# Patient Record
Sex: MALE | Race: WHITE
[De-identification: names, ages, dates, MRNs, and addresses within clinical notes are randomized per-mention and may not be internally consistent; named-entity substitution may affect disease eponyms.]

---

## 2018-05-15 ENCOUNTER — HOSPITAL ENCOUNTER (OUTPATIENT)
Dept: HOSPITAL 95 - LAB | Age: 53
End: 2018-05-15
Attending: NURSE PRACTITIONER
Payer: COMMERCIAL

## 2018-05-15 DIAGNOSIS — E11.9: Primary | ICD-10-CM

## 2018-05-15 LAB
CREAT UR-MCNC: 58.8 MG/DL (ref 27–270)
MICROALBUMIN UR-MCNC: 5.15 MG/L (ref 0–20)
MICROALBUMIN/CREAT UR: 8.76 MG/G (ref 0–30)

## 2018-09-19 ENCOUNTER — HOSPITAL ENCOUNTER (EMERGENCY)
Dept: HOSPITAL 95 - ER | Age: 53
Discharge: HOME | End: 2018-09-19
Payer: COMMERCIAL

## 2018-09-19 VITALS — HEIGHT: 73 IN | WEIGHT: 270 LBS | BODY MASS INDEX: 35.78 KG/M2

## 2018-09-19 DIAGNOSIS — B86: Primary | ICD-10-CM

## 2018-09-19 DIAGNOSIS — F17.200: ICD-10-CM

## 2019-11-24 ENCOUNTER — HOSPITAL ENCOUNTER (EMERGENCY)
Dept: HOSPITAL 95 - ER | Age: 54
Discharge: HOME | End: 2019-11-24
Payer: COMMERCIAL

## 2019-11-24 VITALS — WEIGHT: 270 LBS | HEIGHT: 73 IN | BODY MASS INDEX: 35.78 KG/M2

## 2019-11-24 DIAGNOSIS — F17.200: ICD-10-CM

## 2019-11-24 DIAGNOSIS — M26.603: Primary | ICD-10-CM

## 2019-11-24 DIAGNOSIS — E11.9: ICD-10-CM

## 2019-11-24 DIAGNOSIS — Z86.73: ICD-10-CM

## 2021-02-14 ENCOUNTER — HOSPITAL ENCOUNTER (EMERGENCY)
Dept: HOSPITAL 95 - ER | Age: 56
LOS: 1 days | Discharge: HOME | End: 2021-02-15
Payer: COMMERCIAL

## 2021-02-14 VITALS — BODY MASS INDEX: 29.82 KG/M2 | HEIGHT: 73 IN | WEIGHT: 225 LBS
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
## 2021-02-14 DIAGNOSIS — J44.9: ICD-10-CM

## 2021-02-14 DIAGNOSIS — S62.636B: Primary | ICD-10-CM

## 2021-02-14 DIAGNOSIS — K21.9: ICD-10-CM

## 2021-02-14 DIAGNOSIS — E11.9: ICD-10-CM

## 2021-02-14 DIAGNOSIS — Z86.73: ICD-10-CM

## 2021-02-14 DIAGNOSIS — F17.200: ICD-10-CM

## 2021-02-14 DIAGNOSIS — Z23: ICD-10-CM

## 2021-02-14 PROCEDURE — A9270 NON-COVERED ITEM OR SERVICE: HCPCS

## 2021-10-01 ENCOUNTER — HOSPITAL ENCOUNTER (INPATIENT)
Dept: HOSPITAL 95 - ER | Age: 56
LOS: 6 days | Discharge: HOME | DRG: 871 | End: 2021-10-07
Attending: HOSPITALIST | Admitting: HOSPITALIST
Payer: COMMERCIAL

## 2021-10-01 VITALS — BODY MASS INDEX: 32.43 KG/M2 | HEIGHT: 73 IN | WEIGHT: 244.71 LBS

## 2021-10-01 DIAGNOSIS — I24.8: ICD-10-CM

## 2021-10-01 DIAGNOSIS — E11.9: ICD-10-CM

## 2021-10-01 DIAGNOSIS — E87.6: ICD-10-CM

## 2021-10-01 DIAGNOSIS — K21.9: ICD-10-CM

## 2021-10-01 DIAGNOSIS — A40.3: Primary | ICD-10-CM

## 2021-10-01 DIAGNOSIS — I48.0: ICD-10-CM

## 2021-10-01 DIAGNOSIS — R65.21: ICD-10-CM

## 2021-10-01 DIAGNOSIS — G40.909: ICD-10-CM

## 2021-10-01 DIAGNOSIS — F32.9: ICD-10-CM

## 2021-10-01 DIAGNOSIS — F17.210: ICD-10-CM

## 2021-10-01 DIAGNOSIS — Z86.73: ICD-10-CM

## 2021-10-01 DIAGNOSIS — Z20.822: ICD-10-CM

## 2021-10-01 DIAGNOSIS — J44.0: ICD-10-CM

## 2021-10-01 DIAGNOSIS — J13: ICD-10-CM

## 2021-10-01 DIAGNOSIS — Z86.14: ICD-10-CM

## 2021-10-01 DIAGNOSIS — Z79.899: ICD-10-CM

## 2021-10-01 DIAGNOSIS — J96.01: ICD-10-CM

## 2021-10-01 LAB
ALBUMIN SERPL BCP-MCNC: 2.6 G/DL (ref 3.4–5)
ALBUMIN/GLOB SERPL: 0.6 {RATIO} (ref 0.8–1.8)
ALT SERPL W P-5'-P-CCNC: 54 U/L (ref 12–78)
AMPHETAMINES UR SCN-MCNC: DETECTED NG/ML
AMPHETAMINES UR-MCNC: DETECTED UG/L
ANION GAP SERPL CALCULATED.4IONS-SCNC: 11 MMOL/L (ref 6–16)
AST SERPL W P-5'-P-CCNC: 76 U/L (ref 12–37)
BASOPHILS # BLD: 0 K/MM3 (ref 0–0.23)
BASOPHILS NFR BLD: 0 % (ref 0–2)
BILIRUB SERPL-MCNC: 0.9 MG/DL (ref 0.1–1)
BUN SERPL-MCNC: 33 MG/DL (ref 8–24)
CALCIUM SERPL-MCNC: 9 MG/DL (ref 8.5–10.1)
CHLORIDE SERPL-SCNC: 102 MMOL/L (ref 98–108)
CO2 SERPL-SCNC: 23 MMOL/L (ref 21–32)
CREAT SERPL-MCNC: 1.2 MG/DL (ref 0.6–1.2)
DEPRECATED RDW RBC AUTO: 39.7 FL (ref 35.1–46.3)
EOSINOPHIL # BLD: 0 K/MM3 (ref 0–0.68)
EOSINOPHIL NFR BLD: 0 % (ref 0–6)
ERYTHROCYTE [DISTWIDTH] IN BLOOD BY AUTOMATED COUNT: 12.3 % (ref 11.7–14.2)
GLOBULIN SER CALC-MCNC: 4.4 G/DL (ref 2.2–4)
GLUCOSE SERPL-MCNC: 129 MG/DL (ref 70–99)
HCT VFR BLD AUTO: 42.5 % (ref 37–53)
HGB BLD-MCNC: 14.7 G/DL (ref 13.5–17.5)
LYMPHOCYTES # BLD: 1.64 K/MM3 (ref 0.84–5.2)
LYMPHOCYTES NFR BLD: 17 % (ref 21–46)
MCHC RBC AUTO-ENTMCNC: 34.6 G/DL (ref 31.5–36.5)
MCV RBC AUTO: 87 FL (ref 80–100)
METAMYELOCYTES # BLD MANUAL: 0.95 K/MM3 (ref 0–0)
METAMYELOCYTES NFR BLD MANUAL: 11 % (ref 0–0)
MONOCYTES # BLD: 0.6 K/MM3 (ref 0.16–1.47)
MONOCYTES NFR BLD: 7 % (ref 4–13)
NEUTS BAND NFR BLD MANUAL: 37 % (ref 0–8)
NEUTS SEG # BLD MANUAL: 5.45 K/MM3 (ref 1.96–9.15)
NEUTS SEG NFR BLD MANUAL: 26 % (ref 41–73)
NRBC # BLD AUTO: 0 K/MM3 (ref 0–0.02)
NRBC BLD AUTO-RTO: 0 /100 WBC (ref 0–0.2)
PLATELET # BLD AUTO: 147 K/MM3 (ref 150–400)
POTASSIUM SERPL-SCNC: 4 MMOL/L (ref 3.5–5.5)
PROT SERPL-MCNC: 7 G/DL (ref 6.4–8.2)
PROT UR STRIP-MCNC: (no result) MG/DL
RBC #/AREA URNS HPF: (no result) /HPF (ref 0–2)
SARS-COV-2 RNA RESP QL NAA+PROBE: NEGATIVE
SODIUM SERPL-SCNC: 136 MMOL/L (ref 136–145)
SP GR SPEC: 1.01 (ref 1–1.02)
TOTAL CELLS COUNTED BLD: 100
TROPONIN I SERPL-MCNC: 0.07 NG/ML (ref 0–0.04)
UROBILINOGEN UR STRIP-MCNC: (no result) MG/DL
VARIANT LYMPHS NFR BLD MANUAL: 2 % (ref 0–0)
WBC #/AREA URNS HPF: (no result) /HPF (ref 0–5)

## 2021-10-01 PROCEDURE — A9270 NON-COVERED ITEM OR SERVICE: HCPCS

## 2021-10-01 PROCEDURE — C9113 INJ PANTOPRAZOLE SODIUM, VIA: HCPCS

## 2021-10-01 PROCEDURE — U0004 COV-19 TEST NON-CDC HGH THRU: HCPCS

## 2021-10-01 NOTE — NUR
Care Assumed 1205- New admit from ED
 
Pt arrived via bed, able to stand and transfer to ICU bed. A/O to location,
event, and year. Following directions, slow to respond. LS clear/dim, on 5 L
via NC, SPO2 89-92%. States having CP 4/10, on/off x 3 days, states he feels
pressure during deep inspiration. States having nausea but no vomiting thus
far. Has productive cough but no sputum yet, will send sample. Quickly falls
asleep when left alone, snoring, denies use of CPAP at home or oxygen. SIT,
MAP > 65.

## 2021-10-01 NOTE — NUR
Shift summary
 
Pt remains A/O X4, location, event, year, and following commands. Able to
ambulate to bedside commode. LR @ 75 ml/hr, MAP > 65. SIT. Remains on 5 L NC,
SPO2 90'S. Snoring while sleeping. Spoke to patients girlfriend and updated on
current care being provided. Watching TV currently. Will report to oncoming
shift.

## 2021-10-01 NOTE — NUR
REPORT RECEIVED-CARE ASSUMED-LR INFUSING @ 75 ML/HR. PT AWAKE-WATCHING TV.
CONTINUE ASSESSMENTS AND CARE.

## 2021-10-01 NOTE — NUR
Provider call
 
Spoke to MELANIA Long in regards to patient SBP of 140's. New orders recieved to
decrease LR to 75 ml/hr and DC Vanco. Updated on pt also having loose BM.
Started PT on VSL tabs. Pt able to ambulate to bedside commode, tolerated
well.

## 2021-10-02 LAB
ALBUMIN SERPL BCP-MCNC: 2 G/DL (ref 3.4–5)
ALBUMIN/GLOB SERPL: 0.5 {RATIO} (ref 0.8–1.8)
ALT SERPL W P-5'-P-CCNC: 38 U/L (ref 12–78)
ANION GAP SERPL CALCULATED.4IONS-SCNC: 7 MMOL/L (ref 6–16)
AST SERPL W P-5'-P-CCNC: 47 U/L (ref 12–37)
BASOPHILS # BLD: 0 K/MM3 (ref 0–0.23)
BASOPHILS NFR BLD: 0 % (ref 0–2)
BILIRUB SERPL-MCNC: 1 MG/DL (ref 0.1–1)
BUN SERPL-MCNC: 19 MG/DL (ref 8–24)
CALCIUM SERPL-MCNC: 8.6 MG/DL (ref 8.5–10.1)
CHLORIDE SERPL-SCNC: 107 MMOL/L (ref 98–108)
CO2 SERPL-SCNC: 25 MMOL/L (ref 21–32)
CREAT SERPL-MCNC: 0.76 MG/DL (ref 0.6–1.2)
DEPRECATED RDW RBC AUTO: 40 FL (ref 35.1–46.3)
EOSINOPHIL # BLD: 0.11 K/MM3 (ref 0–0.68)
EOSINOPHIL NFR BLD: 1 % (ref 0–6)
ERYTHROCYTE [DISTWIDTH] IN BLOOD BY AUTOMATED COUNT: 12.5 % (ref 11.7–14.2)
GLOBULIN SER CALC-MCNC: 4 G/DL (ref 2.2–4)
GLUCOSE SERPL-MCNC: 88 MG/DL (ref 70–99)
HCT VFR BLD AUTO: 36.1 % (ref 37–53)
HGB BLD-MCNC: 12.3 G/DL (ref 13.5–17.5)
LYMPHOCYTES # BLD: 1.47 K/MM3 (ref 0.84–5.2)
LYMPHOCYTES NFR BLD: 13 % (ref 21–46)
MAGNESIUM SERPL-MCNC: 1.8 MG/DL (ref 1.6–2.4)
MCHC RBC AUTO-ENTMCNC: 34.1 G/DL (ref 31.5–36.5)
MCV RBC AUTO: 87 FL (ref 80–100)
METAMYELOCYTES # BLD MANUAL: 0.11 K/MM3 (ref 0–0)
METAMYELOCYTES NFR BLD MANUAL: 1 % (ref 0–0)
MONOCYTES # BLD: 0.33 K/MM3 (ref 0.16–1.47)
MONOCYTES NFR BLD: 3 % (ref 4–13)
NEUTS BAND NFR BLD MANUAL: 30 % (ref 0–8)
NEUTS SEG # BLD MANUAL: 9.29 K/MM3 (ref 1.96–9.15)
NEUTS SEG NFR BLD MANUAL: 52 % (ref 41–73)
NRBC # BLD AUTO: 0 K/MM3 (ref 0–0.02)
NRBC BLD AUTO-RTO: 0 /100 WBC (ref 0–0.2)
PLATELET # BLD AUTO: 138 K/MM3 (ref 150–400)
POTASSIUM SERPL-SCNC: 3.6 MMOL/L (ref 3.5–5.5)
PROT SERPL-MCNC: 6 G/DL (ref 6.4–8.2)
SODIUM SERPL-SCNC: 139 MMOL/L (ref 136–145)
TOTAL CELLS COUNTED BLD: 100

## 2021-10-02 NOTE — NUR
PT'S HEART RATE MAINTAINED 'S FOR GREATER THAN 15 MIN; DR. FOUNTAIN
WAS CALLED AT 0828; ORDERS PROVIDED FOR RX FOR TACHYCARDIA AND
ADMINISTERED PER MAR; PROVIDER AT BEDSIDE TO ASSESS; HEART RATE
MAINTAINED IN 80'S-90'S AFTER METOPROLOL/LABETALOL ADMINISTRATION; STATUS
CHANGE ORDER PLACED AT 0856 FOR PCU; PT VOIDED 3X IN TOILET
AND REFUSED URINAL; RX GIVEN PER MAR; PT REPORTED FRUSTRATION WITH ICU
MONITORING; PT'S DOC WOOD CALLED FOR UPDATES 2X AND WAS CALLED 1X TO
NOTIFY OF PT'S ROOM ASSIGNMENT IN PCU; AT 1709 TALON, RN WAS CALLED AND REPORT
GIVEN OVER THE PHONE; PT LEFT UNIT AT 1725 VIA WHEELCHAIR WITH 5LNC, NO
MONITORING, WITH PERSONAL PHONE AND CLOTHING, ACCOMPANIED BY [id number]; PT REPORTED
NO ADDITIONAL CONCERNS AT [facility]

## 2021-10-02 NOTE — NUR
Assumed care of pt at 1900. Patient sitting in bed, sleeping. 5L NC and
satting above 90%. Afib avg 95, but has jumped numerous times into 170's.
Patient is very lethargic. Awake to answer questions appropriately, but
quickly falls back asleep. Dim t/o with a strong non-productive cough. Redness
noted at the back of the mouth/throat.

## 2021-10-02 NOTE — NUR
END OF SHIFT NOTE
PT ON 5 L N/C OVERNIGHT-DESATS WITH ACTIVITY-PT UP TO BSC TO VOID-ONGOING
HACKING COUGH. ST ON THE MONITOR 100-118. VITALS-SURGE SHIFT ASSESSMENTS X
3-AND GTT RATES NOTED IN FLOWSHEET.
CONINUTE ASSESSMENTS AND CARE TILL
REPORT OFF TO ONCOMING SHIFT RN.

## 2021-10-02 NOTE — NUR
Telephone report received from Sara GUTIÉRREZ RN at this time. Anticipate arrival
of pt to PCU 3 shortly.

## 2021-10-02 NOTE — NUR
Received pt from ICU in wheelchair, wearing oxygen at 5 l/min.  Frequent
produtive cough.  Scattered crackles auscultated throughout, no wheezing.
Mild dyspnea and tachypnea noted, but pt states that he is so much better than
yesterday. States it's like "night and day".  Lactated ringer's infusing via
IV right antecubital space.  States good appetite, asking also for ice water.
He called his fiance Maria T on the phone and told her that he had been moved to
PCU.  Sitting up in bed, eating dinner at this time, watching TV.
Sinus tachycardia noted on telemetry monitor, rate 104-109 bpm.

## 2021-10-03 LAB
BASOPHILS # BLD AUTO: 0.05 K/MM3 (ref 0–0.23)
BASOPHILS NFR BLD AUTO: 1 % (ref 0–2)
DEPRECATED RDW RBC AUTO: 39.9 FL (ref 35.1–46.3)
EOSINOPHIL # BLD AUTO: 0.27 K/MM3 (ref 0–0.68)
EOSINOPHIL NFR BLD AUTO: 3 % (ref 0–6)
ERYTHROCYTE [DISTWIDTH] IN BLOOD BY AUTOMATED COUNT: 12.5 % (ref 11.7–14.2)
HCT VFR BLD AUTO: 33.7 % (ref 37–53)
HGB BLD-MCNC: 11.6 G/DL (ref 13.5–17.5)
IMM GRANULOCYTES # BLD AUTO: 0.2 K/MM3 (ref 0–0.1)
IMM GRANULOCYTES NFR BLD AUTO: 2 % (ref 0–1)
LYMPHOCYTES # BLD AUTO: 1 K/MM3 (ref 0.84–5.2)
LYMPHOCYTES NFR BLD AUTO: 10 % (ref 21–46)
MCHC RBC AUTO-ENTMCNC: 34.4 G/DL (ref 31.5–36.5)
MCV RBC AUTO: 88 FL (ref 80–100)
MONOCYTES # BLD AUTO: 1.33 K/MM3 (ref 0.16–1.47)
MONOCYTES NFR BLD AUTO: 13 % (ref 4–13)
NEUTROPHILS # BLD AUTO: 7.26 K/MM3 (ref 1.96–9.15)
NEUTROPHILS NFR BLD AUTO: 72 % (ref 41–73)
NRBC # BLD AUTO: 0 K/MM3 (ref 0–0.02)
NRBC BLD AUTO-RTO: 0 /100 WBC (ref 0–0.2)
PLATELET # BLD AUTO: 160 K/MM3 (ref 150–400)

## 2021-10-03 NOTE — NUR
Patient appears very drowsy and exhausted. Afib in the 170's at the beginning
of the shift, cardizem was ordered but after the PO metoprolol the HR slowed
to 80-90's still in Afib. Diminished t/o with strong non-productive cough.
Satting high 90's on 6L HF NC. Patient had a hard time getting quality sleep
d/t waking up and coughing or nausea. PRN Zofran and Ambien given and patient
resting comfortably. Will report to dayshift RN.

## 2021-10-03 NOTE — NUR
SHIFT SUMMARY
ASSUMED CARE OF PATIENT AT APPROX 0700. PT APPEARS TO BE SLEEPING,
RESPONDING TO VERBAL STIMULI, BUT QUICKLY FALLING BACK TO SLEEP. UNABLE TO
HOLD HEAD UP FOR LONG. ANSWERING QUESTIONS APPROPRAITELY. PT DENIES PAIN,
CHEST PAIN, NAUSEA, DIZZINESS AND NUMB/TINGLING. PT REPORTS DRY NONPRODUCTIVE
COUGHT. PT ON 5-6L O2 VIA NC, THIS AM, TITRATED TO 4L O2 VIA NC THIS AM. RESP
RATE 20-38 BREATHES. PT RECIEVING IV ANTIVBIOTICS AND IV FLUIDS. OTHER VSS. NO
OTHER ACUTE CHANGES NOTED. WILL CONTINUE TO MONITOR UNITL REPORT GIVEN TO
ONCOMING RN.

## 2021-10-04 LAB
ALBUMIN SERPL BCP-MCNC: 1.7 G/DL (ref 3.4–5)
ANION GAP SERPL CALCULATED.4IONS-SCNC: 9 MMOL/L (ref 6–16)
BUN SERPL-MCNC: 14 MG/DL (ref 8–24)
CALCIUM SERPL-MCNC: 8.4 MG/DL (ref 8.5–10.1)
CHLORIDE SERPL-SCNC: 104 MMOL/L (ref 98–108)
CO2 SERPL-SCNC: 26 MMOL/L (ref 21–32)
CREAT SERPL-MCNC: 0.65 MG/DL (ref 0.6–1.2)
DEPRECATED RDW RBC AUTO: 40 FL (ref 35.1–46.3)
ERYTHROCYTE [DISTWIDTH] IN BLOOD BY AUTOMATED COUNT: 12.5 % (ref 11.7–14.2)
GLUCOSE SERPL-MCNC: 91 MG/DL (ref 70–99)
HCT VFR BLD AUTO: 35 % (ref 37–53)
HGB BLD-MCNC: 11.9 G/DL (ref 13.5–17.5)
MAGNESIUM SERPL-MCNC: 2 MG/DL (ref 1.6–2.4)
MCHC RBC AUTO-ENTMCNC: 34 G/DL (ref 31.5–36.5)
MCV RBC AUTO: 87 FL (ref 80–100)
NRBC # BLD AUTO: 0 K/MM3 (ref 0–0.02)
NRBC BLD AUTO-RTO: 0 /100 WBC (ref 0–0.2)
PHOSPHATE SERPL-MCNC: 3.2 MG/DL (ref 2.5–4.9)
PLATELET # BLD AUTO: 160 K/MM3 (ref 150–400)
POTASSIUM SERPL-SCNC: 3.2 MMOL/L (ref 3.5–5.5)
POTASSIUM SERPL-SCNC: 3.4 MMOL/L (ref 3.5–5.5)
SODIUM SERPL-SCNC: 139 MMOL/L (ref 136–145)

## 2021-10-04 NOTE — NUR
SHIFT SUMMARY
PT REPROTS BACK PAIN AFTER COUGHING EPISODE, 5/10 PAIN, MEIDCATED WITH TYLENOL
AND HEATING PAD WITH POSITIVE RESULTS. TITRATED PT TO 2L O2 VIA NC, SPO2
DESAT TO 86-88%, TITRATED TO 3L O2 VIA NC, >90%. PT CONTINUES TO HAVE COUGHIN
EPISODES, MEDCIATED WITH ROBITUSSEN WITH COEDINE WITH POSITIVE RESUTLS. TEMP
100.2 THIS AM, TRENIDING DOWN T/O SHIFT. OTHER VSS. NO OTHER ACUTE CHANGES
NOTED. WILL CONTINUE TO MONITOR UNTIL REPORT GIVEN TO ONCOMING RN.

## 2021-10-04 NOTE — NUR
No acute changes this shift. VSS. Patient c/o throat pain r/t coughing, throat
is noted to be red and inflamed. Patient slept most of the night sitting in
high fowlers (most comfortable per patient). Very lethargic when awake, a&ox4
but quickly falls back asleep after answering questions. R and L lower lobes
dim, R top lobes fine crackles, L top lobe ronchi. Sinus rhythm/Sinus tach
's, VSS. Urine noted to be saji in color, LR @75ml/hr. Will report to
dayshift RN.

## 2021-10-04 NOTE — NUR
AM NOTE
ASSUMED CARE OF PT AT APPROX 0700.
PT ALERT, ORIENTED. CALM AND COOPERATIVE WITH CARE. PT RESTING IN BED. UP SBA
IN ROOM. PT DENIES PAIN, CHEST PAIN, [place], DIZZINESS AND NUMB/TINGLING. PT
SOB WITH MOVEMENT, SPO2 >90% ON 4L O2 VIA NC, RESP RATE 20-30'S. PRODUCTIVE
COUGH, THICK TAN SPUTUM. TELE SINUS TACH. BP STABE. OTHER VSS. NO OTHER ACUTE
CHAGNES NOTED. WILL CONTINUE TO MONITOR.

## 2021-10-05 LAB
ALBUMIN SERPL BCP-MCNC: 1.8 G/DL (ref 3.4–5)
ANION GAP SERPL CALCULATED.4IONS-SCNC: 7 MMOL/L (ref 6–16)
BUN SERPL-MCNC: 12 MG/DL (ref 8–24)
CALCIUM SERPL-MCNC: 8.4 MG/DL (ref 8.5–10.1)
CHLORIDE SERPL-SCNC: 105 MMOL/L (ref 98–108)
CO2 SERPL-SCNC: 25 MMOL/L (ref 21–32)
CREAT SERPL-MCNC: 0.64 MG/DL (ref 0.6–1.2)
DEPRECATED RDW RBC AUTO: 40.2 FL (ref 35.1–46.3)
ERYTHROCYTE [DISTWIDTH] IN BLOOD BY AUTOMATED COUNT: 12.6 % (ref 11.7–14.2)
GLUCOSE SERPL-MCNC: 98 MG/DL (ref 70–99)
HCT VFR BLD AUTO: 34.4 % (ref 37–53)
HGB BLD-MCNC: 11.8 G/DL (ref 13.5–17.5)
MCHC RBC AUTO-ENTMCNC: 34.3 G/DL (ref 31.5–36.5)
MCV RBC AUTO: 87 FL (ref 80–100)
NRBC # BLD AUTO: 0 K/MM3 (ref 0–0.02)
NRBC BLD AUTO-RTO: 0 /100 WBC (ref 0–0.2)
PHOSPHATE SERPL-MCNC: 2.6 MG/DL (ref 2.5–4.9)
PLATELET # BLD AUTO: 173 K/MM3 (ref 150–400)
POTASSIUM SERPL-SCNC: 3.7 MMOL/L (ref 3.5–5.5)
SODIUM SERPL-SCNC: 137 MMOL/L (ref 136–145)

## 2021-10-05 NOTE — NUR
AM NOTE AND TRANSFER TO [facility]
ASSUMED CARE OF PATIEINT AT APPROX 0700. PT A&Ox4; CALM AND COOPERATIVE WITH
CARE. PT RESTING IN BED. UP TO SIDE ON BED IND, UP TO BSC 1 PERSON ASSIST. SOB
WITH EXERTION, ON 4L O2 VIA NC THIS AM, PRODUCTIVE COUGH THICK TAN SPUTUM. PT
REPORTS BACK PAIN, STARTED YESTERDAY AFTER COUGHING SPELL, DENIES NEED FOR
INTERVENTION AT THIS TIME. PT DENIES NAUSEA, HOWEVER REPORTS POOR APPETITE. PT
PT REPORTS INTERMITTENT NUMBNESS TO BLE, REPORTS NORMAL FOR HIM. VSS. NO OTHER
ACUTE CHANGES NOTED. REPORT GIVEN TO SANDRA RODRIGUEZ ASSUMING CARE OF PATIENT. PT
LEFT ROOM VIA WHEELCHAIR AT APPROX 0920.

## 2021-10-05 NOTE — NUR
ASSUMED CARE OF PT. REPORT RECEIVED FROM EFRAIN RODRIGUEZ IN PCU. PT ARRIVED .
PT ORIENTED TO ROOM, CALL LIGHT AND MED FLOOR. PT IV LR RESTARTED AT 75
ML/HR PER ORDER. PT VITALS OBTAINED. PT COUGHING ON TX AND GAVE CEPACOL
LOZENGE. NO ACUTE CONCERNS AT THIS TIME. BED IN LOW POSITION AND CALL LIGHT IN
REACH.

## 2021-10-05 NOTE — NUR
SHIFT SUMMARY: PT A/O X4 STANDBY ASSIST. PT HAD LOW GRADE FEVER THIS AM AND
TYLENOL GIVEN AND HAS BEEN AFEBRILE SINCE. PT HAS HARSH COUGH, TESSALON PEARLS
HELPFUL IN CONTROLLING COUGH. PT HAS MOD AMT OF SPUTUM PRODUCTION GRAY GREEN
COLOR. PT REPORTS PAIN IN BACK FROM COUGH BUT DENIES NEED FOR PAIN MEDICATION.

## 2021-10-06 LAB
ALBUMIN SERPL BCP-MCNC: 1.6 G/DL (ref 3.4–5)
ANION GAP SERPL CALCULATED.4IONS-SCNC: 8 MMOL/L (ref 6–16)
BUN SERPL-MCNC: 11 MG/DL (ref 8–24)
CALCIUM SERPL-MCNC: 8.6 MG/DL (ref 8.5–10.1)
CHLORIDE SERPL-SCNC: 107 MMOL/L (ref 98–108)
CO2 SERPL-SCNC: 24 MMOL/L (ref 21–32)
CREAT SERPL-MCNC: 0.63 MG/DL (ref 0.6–1.2)
DEPRECATED RDW RBC AUTO: 40.4 FL (ref 35.1–46.3)
ERYTHROCYTE [DISTWIDTH] IN BLOOD BY AUTOMATED COUNT: 12.5 % (ref 11.7–14.2)
GLUCOSE SERPL-MCNC: 89 MG/DL (ref 70–99)
HCT VFR BLD AUTO: 32.6 % (ref 37–53)
HGB BLD-MCNC: 11 G/DL (ref 13.5–17.5)
MCHC RBC AUTO-ENTMCNC: 33.7 G/DL (ref 31.5–36.5)
MCV RBC AUTO: 87 FL (ref 80–100)
NRBC # BLD AUTO: 0 K/MM3 (ref 0–0.02)
NRBC BLD AUTO-RTO: 0 /100 WBC (ref 0–0.2)
PHOSPHATE SERPL-MCNC: 3.6 MG/DL (ref 2.5–4.9)
PLATELET # BLD AUTO: 193 K/MM3 (ref 150–400)
POTASSIUM SERPL-SCNC: 3.9 MMOL/L (ref 3.5–5.5)
SODIUM SERPL-SCNC: 139 MMOL/L (ref 136–145)

## 2021-10-06 NOTE — NUR
a+o, medicated as prescribed, pharmacy stated that LR and Vanco could be run
conncurrently, ran abx seperatly, call light in reach, rm air, lr infusing
with no s/sx of infection or infiltration noted at site

## 2021-10-07 LAB
ALBUMIN SERPL BCP-MCNC: 1.5 G/DL (ref 3.4–5)
ANION GAP SERPL CALCULATED.4IONS-SCNC: 8 MMOL/L (ref 6–16)
BASOPHILS # BLD AUTO: 0.08 K/MM3 (ref 0–0.23)
BASOPHILS # BLD: 0 K/MM3 (ref 0–0.23)
BASOPHILS NFR BLD AUTO: 1 % (ref 0–2)
BASOPHILS NFR BLD: 0 % (ref 0–2)
BUN SERPL-MCNC: 12 MG/DL (ref 8–24)
CALCIUM SERPL-MCNC: 8.2 MG/DL (ref 8.5–10.1)
CHLORIDE SERPL-SCNC: 110 MMOL/L (ref 98–108)
CO2 SERPL-SCNC: 22 MMOL/L (ref 21–32)
CREAT SERPL-MCNC: 0.67 MG/DL (ref 0.6–1.2)
DEPRECATED RDW RBC AUTO: 42.2 FL (ref 35.1–46.3)
EOSINOPHIL # BLD AUTO: 0.52 K/MM3 (ref 0–0.68)
EOSINOPHIL # BLD: 0.33 K/MM3 (ref 0–0.68)
EOSINOPHIL NFR BLD AUTO: 5 % (ref 0–6)
EOSINOPHIL NFR BLD: 3 % (ref 0–6)
ERYTHROCYTE [DISTWIDTH] IN BLOOD BY AUTOMATED COUNT: 12.9 % (ref 11.7–14.2)
GLUCOSE SERPL-MCNC: 95 MG/DL (ref 70–99)
HCT VFR BLD AUTO: 31 % (ref 37–53)
HGB BLD-MCNC: 10.5 G/DL (ref 13.5–17.5)
IMM GRANULOCYTES # BLD AUTO: 0.56 K/MM3 (ref 0–0.1)
IMM GRANULOCYTES NFR BLD AUTO: 5 % (ref 0–1)
LYMPHOCYTES # BLD AUTO: 1.49 K/MM3 (ref 0.84–5.2)
LYMPHOCYTES # BLD: 1.1 K/MM3 (ref 0.84–5.2)
LYMPHOCYTES NFR BLD AUTO: 14 % (ref 21–46)
LYMPHOCYTES NFR BLD: 10 % (ref 21–46)
MCHC RBC AUTO-ENTMCNC: 33.9 G/DL (ref 31.5–36.5)
MCV RBC AUTO: 89 FL (ref 80–100)
MONOCYTES # BLD AUTO: 0.77 K/MM3 (ref 0.16–1.47)
MONOCYTES # BLD: 0.55 K/MM3 (ref 0.16–1.47)
MONOCYTES NFR BLD AUTO: 7 % (ref 4–13)
MONOCYTES NFR BLD: 5 % (ref 4–13)
MYELOCYTES # BLD MANUAL: 0.55 K/MM3 (ref 0–0)
MYELOCYTES NFR BLD MANUAL: 5 % (ref 0–0)
NEUTROPHILS # BLD AUTO: 7.59 K/MM3 (ref 1.96–9.15)
NEUTROPHILS NFR BLD AUTO: 69 % (ref 41–73)
NEUTS SEG # BLD MANUAL: 8.47 K/MM3 (ref 1.96–9.15)
NEUTS SEG NFR BLD MANUAL: 77 % (ref 41–73)
NRBC # BLD AUTO: 0 K/MM3 (ref 0–0.02)
NRBC BLD AUTO-RTO: 0 /100 WBC (ref 0–0.2)
PHOSPHATE SERPL-MCNC: 3.8 MG/DL (ref 2.5–4.9)
PLATELET # BLD AUTO: 242 K/MM3 (ref 150–400)
POTASSIUM SERPL-SCNC: 3.7 MMOL/L (ref 3.5–5.5)
SODIUM SERPL-SCNC: 140 MMOL/L (ref 136–145)
TOTAL CELLS COUNTED BLD: 100

## 2021-10-07 NOTE — NUR
PT IS A&OX4. DIMINISHED LUNG SOUNDS NOTED IN ALL LOBES. PT C/O PAIN. PRN PAIN
MED ADMINISTERED PER EMAR, PT IS 4L O2. ADLS PROVIDED, SAFETY MEASRES IN
PLACE, WILL CONTINUE TRO MONITOR.

## 2022-06-09 ENCOUNTER — HOSPITAL ENCOUNTER (OUTPATIENT)
Dept: HOSPITAL 95 - LAB SHORT | Age: 57
Discharge: HOME | End: 2022-06-09
Attending: NURSE PRACTITIONER
Payer: COMMERCIAL

## 2022-06-09 DIAGNOSIS — Z13.6: Primary | ICD-10-CM

## 2022-06-09 DIAGNOSIS — I48.0: ICD-10-CM

## 2022-06-09 LAB
ALBUMIN SERPL BCP-MCNC: 3.7 G/DL (ref 3.4–5)
ALBUMIN/GLOB SERPL: 1.2 {RATIO} (ref 0.8–1.8)
ALT SERPL W P-5'-P-CCNC: 50 U/L (ref 12–78)
ANION GAP SERPL CALCULATED.4IONS-SCNC: 7 MMOL/L (ref 6–16)
AST SERPL W P-5'-P-CCNC: 25 U/L (ref 12–37)
BASOPHILS # BLD AUTO: 0.03 K/MM3 (ref 0–0.23)
BASOPHILS NFR BLD AUTO: 0 % (ref 0–2)
BILIRUB SERPL-MCNC: 0.3 MG/DL (ref 0.1–1)
BUN SERPL-MCNC: 18 MG/DL (ref 8–24)
CALCIUM SERPL-MCNC: 8.8 MG/DL (ref 8.5–10.1)
CHLORIDE SERPL-SCNC: 110 MMOL/L (ref 98–108)
CHOLEST SERPL-MCNC: 155 MG/DL (ref 50–200)
CHOLEST/HDLC SERPL: 4.1 {RATIO}
CO2 SERPL-SCNC: 24 MMOL/L (ref 21–32)
CREAT SERPL-MCNC: 0.86 MG/DL (ref 0.6–1.2)
DEPRECATED RDW RBC AUTO: 39.1 FL (ref 35.1–46.3)
EOSINOPHIL # BLD AUTO: 0.36 K/MM3 (ref 0–0.68)
EOSINOPHIL NFR BLD AUTO: 4 % (ref 0–6)
ERYTHROCYTE [DISTWIDTH] IN BLOOD BY AUTOMATED COUNT: 12 % (ref 11.7–14.2)
GLOBULIN SER CALC-MCNC: 3.2 G/DL (ref 2.2–4)
GLUCOSE SERPL-MCNC: 106 MG/DL (ref 70–99)
HCT VFR BLD AUTO: 41.9 % (ref 37–53)
HDLC SERPL-MCNC: 38 MG/DL (ref 39–?)
HGB BLD-MCNC: 14.3 G/DL (ref 13.5–17.5)
IMM GRANULOCYTES # BLD AUTO: 0.03 K/MM3 (ref 0–0.1)
IMM GRANULOCYTES NFR BLD AUTO: 0 % (ref 0–1)
LDLC SERPL CALC-MCNC: 93 MG/DL (ref 0–110)
LDLC/HDLC SERPL: 2.5 {RATIO}
LYMPHOCYTES # BLD AUTO: 2.61 K/MM3 (ref 0.84–5.2)
LYMPHOCYTES NFR BLD AUTO: 29 % (ref 21–46)
MCHC RBC AUTO-ENTMCNC: 34.1 G/DL (ref 31.5–36.5)
MCV RBC AUTO: 88 FL (ref 80–100)
MONOCYTES # BLD AUTO: 0.83 K/MM3 (ref 0.16–1.47)
MONOCYTES NFR BLD AUTO: 9 % (ref 4–13)
NEUTROPHILS # BLD AUTO: 5.02 K/MM3 (ref 1.96–9.15)
NEUTROPHILS NFR BLD AUTO: 57 % (ref 41–73)
NRBC # BLD AUTO: 0 K/MM3 (ref 0–0.02)
NRBC BLD AUTO-RTO: 0 /100 WBC (ref 0–0.2)
PLATELET # BLD AUTO: 200 K/MM3 (ref 150–400)
POTASSIUM SERPL-SCNC: 4 MMOL/L (ref 3.5–5.5)
PROT SERPL-MCNC: 6.9 G/DL (ref 6.4–8.2)
SODIUM SERPL-SCNC: 141 MMOL/L (ref 136–145)
TRIGL SERPL-MCNC: 118 MG/DL (ref 30–160)
VLDLC SERPL CALC-MCNC: 23 MG/DL (ref 6–32)

## 2023-10-24 ENCOUNTER — HOSPITAL ENCOUNTER (EMERGENCY)
Dept: HOSPITAL 95 - ER | Age: 58
Discharge: HOME | End: 2023-10-24
Payer: COMMERCIAL

## 2023-10-24 VITALS — DIASTOLIC BLOOD PRESSURE: 81 MMHG | SYSTOLIC BLOOD PRESSURE: 115 MMHG

## 2023-10-24 VITALS — HEIGHT: 73 IN | BODY MASS INDEX: 39.76 KG/M2 | WEIGHT: 300.01 LBS

## 2023-10-24 DIAGNOSIS — J44.9: ICD-10-CM

## 2023-10-24 DIAGNOSIS — S52.202A: Primary | ICD-10-CM

## 2023-10-24 DIAGNOSIS — E11.9: ICD-10-CM
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
## 2023-10-24 DIAGNOSIS — G43.909: ICD-10-CM

## 2023-10-24 DIAGNOSIS — F17.210: ICD-10-CM

## 2023-10-24 DIAGNOSIS — Z79.899: ICD-10-CM
